# Patient Record
Sex: FEMALE | Race: WHITE | NOT HISPANIC OR LATINO | Employment: UNEMPLOYED | URBAN - METROPOLITAN AREA
[De-identification: names, ages, dates, MRNs, and addresses within clinical notes are randomized per-mention and may not be internally consistent; named-entity substitution may affect disease eponyms.]

---

## 2024-01-11 ENCOUNTER — TELEPHONE (OUTPATIENT)
Dept: OBSTETRICS AND GYNECOLOGY | Facility: CLINIC | Age: 44
End: 2024-01-11
Payer: COMMERCIAL

## 2024-01-11 RX ORDER — TIRZEPATIDE 10 MG/.5ML
10 INJECTION, SOLUTION SUBCUTANEOUS WEEKLY
COMMUNITY
Start: 2024-01-10

## 2024-01-11 RX ORDER — SULFAMETHOXAZOLE AND TRIMETHOPRIM 800; 160 MG/1; MG/1
TABLET ORAL
COMMUNITY
Start: 2023-10-19 | End: 2024-01-17

## 2024-01-11 NOTE — TELEPHONE ENCOUNTER
----- Message from Violeta Burnette sent at 1/11/2024  1:22 PM CST -----  Contact: self  Pt needs to est care pls call [phone] with appt options

## 2024-01-17 ENCOUNTER — OFFICE VISIT (OUTPATIENT)
Dept: OBSTETRICS AND GYNECOLOGY | Facility: CLINIC | Age: 44
End: 2024-01-17
Payer: COMMERCIAL

## 2024-01-17 VITALS — DIASTOLIC BLOOD PRESSURE: 80 MMHG | SYSTOLIC BLOOD PRESSURE: 112 MMHG | WEIGHT: 210.81 LBS

## 2024-01-17 DIAGNOSIS — Z97.5 IUD (INTRAUTERINE DEVICE) IN PLACE: ICD-10-CM

## 2024-01-17 DIAGNOSIS — Z30.9 ENCOUNTER FOR CONTRACEPTIVE MANAGEMENT, UNSPECIFIED TYPE: ICD-10-CM

## 2024-01-17 DIAGNOSIS — Z12.31 BREAST CANCER SCREENING BY MAMMOGRAM: ICD-10-CM

## 2024-01-17 DIAGNOSIS — Z01.419 ENCOUNTER FOR WELL WOMAN EXAM WITH ROUTINE GYNECOLOGICAL EXAM: Primary | ICD-10-CM

## 2024-01-17 PROCEDURE — 3074F SYST BP LT 130 MM HG: CPT | Mod: CPTII,S$GLB,, | Performed by: OBSTETRICS & GYNECOLOGY

## 2024-01-17 PROCEDURE — 1159F MED LIST DOCD IN RCRD: CPT | Mod: CPTII,S$GLB,, | Performed by: OBSTETRICS & GYNECOLOGY

## 2024-01-17 PROCEDURE — 99386 PREV VISIT NEW AGE 40-64: CPT | Mod: S$GLB,,, | Performed by: OBSTETRICS & GYNECOLOGY

## 2024-01-17 PROCEDURE — 3079F DIAST BP 80-89 MM HG: CPT | Mod: CPTII,S$GLB,, | Performed by: OBSTETRICS & GYNECOLOGY

## 2024-01-17 RX ORDER — ONDANSETRON 4 MG/1
TABLET, ORALLY DISINTEGRATING ORAL
COMMUNITY
Start: 2023-11-17

## 2024-01-17 RX ORDER — LEVOCETIRIZINE DIHYDROCHLORIDE 5 MG/1
5 TABLET, FILM COATED ORAL NIGHTLY
COMMUNITY

## 2024-01-17 RX ORDER — THYROID, PORCINE 90 MG/1
TABLET ORAL
COMMUNITY
Start: 2023-11-30

## 2024-01-17 NOTE — PROGRESS NOTES
CC: WELL WOMAN (age 40-44)    Patient Care Team:  celso pennington as Current PCP    HPI:  Patient is a 43 y.o. who presents for her well woman exam today.  History reviewed with patient.   Patient is without complaints or concerns today. It was about 9 yrs ago that her paragard was placed and wants to swap for a new one.      NEW PATIENT       CONTRACEPTION: IUD-paragard  GARDASIL: no- declines  HX ABNL PAPS: yrs ago-no treatment needed    REVIEW OF PRIOR DATA/ HEALTH MAINTENANCE:  LAST ANNUAL:    2016    LAST MMG-  2016-neg-in [place]  LAST LABS- does with PCP   LAST COLONOSCOPY-       Past Medical History:   Diagnosis Date    Hypothyroidism due to Hashimoto's thyroiditis     IUD (intrauterine device) in place     Parragard-placed    Pre-diabetes      SURGICAL HX:   has a past surgical history that includes  section; Laparoscopic gastric banding (); and Abdominoplasty.    SOCIAL HX:    reports that she has never smoked. She has never used smokeless tobacco. She reports that she does not drink alcohol and does not use drugs.    FAMILY HX:   family history includes Breast cancer in her mother; Colon cancer in her maternal grandmother; Diabetes in her father; Heart attack in her mother; Lung cancer in her paternal grandmother. .    ALLERGIES:  Pcn [penicillins]    Current Outpatient Medications   Medication Instructions    ARMOUR THYROID 90 mg Tab     levocetirizine (XYZAL) 5 mg, Oral, Nightly    MOUNJARO 10 mg, Subcutaneous, Weekly    ondansetron (ZOFRAN-ODT) 4 MG TbDL      ROS:  CONST:  No fever, chills, fatigue or unexpected changes in weight   CV: No chest pain or palpitations   RESP:  No shortness of breath or cough   GI: No abd pain, vomiting, diarrhea, blood in stool, or changes in bowel mvmts   SKIN: No rashes or lesions  MUSCULOSKELETAL: No joint swelling or pain   PSYCH: No changes in mood or insomia   BREASTS: No asymmetry, lumps, pain, nipple discharge, or skin changes   :  No dysuria,  urgency, frequency, hematuria or incontinence           No vag dc, itching, odor or dryness           No pelvic pain, dyspareunia, or abnormal vaginal bleeding     VITALS:  Blood pressure 112/80, weight 95.6 kg (210 lb 12.8 oz), last menstrual period 01/17/2024.  There is no height or weight on file to calculate BMI.     PHYSICAL EXAM-  APPEARANCE: Well appearing, in no acute distress.   NECK: Neck symmetric.   CV:  Normal rate   PULM: Normal resp rate, no resp distress, normal resp effort    PSYCH: Normal mood and affect, cooperative   SKIN: No rashes, lesions, or abnormal bruising   LYMPH: No inguinal or axillary adenopathy   ABD: Soft, without tenderness or masses.   BREAST: Symmetrical, no nipple changes, no skin changes, No palpable masses   PELVIC:  VULVA: No lesions. Normal female genitalia.  URETHRAL MEATUS: No masses, no significant prolapse.   BLADDER/ URETHRA: No masses or suprapubic tenderness   VAGINA: No lesions or erythema, No discharge.   CVX: No lesions,no cervical motion tenderness. -IUD string seen  UTERUS: Normal size/shape, mobile, non-tender   ADNEXA: No masses, tenderness, or fullness.   ANUS/ PERINEUM: Normal tone.  No lesions.     *female chaperone present for entire exam    ASSESSMENT and PLAN:  Encounter for well woman exam with routine gynecological exam  -     Liquid-based pap smear, screening    Breast cancer screening by mammogram  -     Mammo Digital Screening Bilat w/ [facility]; Future; Expected date: 01/31/2024    IUD (intrauterine device) in place    Encounter for contraceptive management, unspecified type  -     Device Authorization Order       FOLLOWUP:  1 year for wwe or sooner prn    COUNSELING:  Patient was counseled today on recommendation for yearly pelvic exam, current Pap guidelines, self breast exams, contraception, recommendations for annual screening mammograms, and routine screening colonoscopy at age 45.  Encouraged patient to see her PCP for other health maintenance.

## 2024-01-22 ENCOUNTER — TELEPHONE (OUTPATIENT)
Dept: OBSTETRICS AND GYNECOLOGY | Facility: CLINIC | Age: 44
End: 2024-01-22
Payer: COMMERCIAL

## 2024-01-22 LAB — Lab: NORMAL

## 2024-01-22 NOTE — PROGRESS NOTES
Please let patient know her pap and hpv are both negative    Please encourage her to set up the [employer] natali if she has not done so

## 2024-01-22 NOTE — TELEPHONE ENCOUNTER
----- Message from Maricarmen Mon MD sent at 1/22/2024 10:47 AM CST -----  Please let patient know her pap and hpv are both negative    Please encourage her to set up the [employer] natali if she has not done so

## 2024-03-11 DIAGNOSIS — Z30.430 ENCOUNTER FOR INSERTION OF INTRAUTERINE CONTRACEPTIVE DEVICE (IUD): Primary | ICD-10-CM

## 2024-07-08 ENCOUNTER — PROCEDURE VISIT (OUTPATIENT)
Dept: OBSTETRICS AND GYNECOLOGY | Facility: CLINIC | Age: 44
End: 2024-07-08
Payer: COMMERCIAL

## 2024-07-08 ENCOUNTER — OFFICE VISIT (OUTPATIENT)
Dept: OBSTETRICS AND GYNECOLOGY | Facility: CLINIC | Age: 44
End: 2024-07-08
Payer: COMMERCIAL

## 2024-07-08 VITALS
BODY MASS INDEX: 26.84 KG/M2 | DIASTOLIC BLOOD PRESSURE: 79 MMHG | HEIGHT: 67 IN | HEART RATE: 118 BPM | WEIGHT: 171 LBS | SYSTOLIC BLOOD PRESSURE: 113 MMHG

## 2024-07-08 DIAGNOSIS — N89.8 VAGINAL DISCHARGE: ICD-10-CM

## 2024-07-08 DIAGNOSIS — Z11.3 ENCOUNTER FOR SCREENING FOR INFECTIONS WITH PREDOMINANTLY SEXUAL MODE OF TRANSMISSION: ICD-10-CM

## 2024-07-08 DIAGNOSIS — N92.6 IRREGULAR BLEEDING: Primary | ICD-10-CM

## 2024-07-08 DIAGNOSIS — N92.6 IRREGULAR BLEEDING: ICD-10-CM

## 2024-07-08 DIAGNOSIS — Z72.89 OTHER PROBLEMS RELATED TO LIFESTYLE: ICD-10-CM

## 2024-07-08 LAB
B-HCG UR QL: NEGATIVE
CTP QC/QA: YES

## 2024-07-08 PROCEDURE — 3074F SYST BP LT 130 MM HG: CPT | Mod: CPTII,S$GLB,,

## 2024-07-08 PROCEDURE — 3008F BODY MASS INDEX DOCD: CPT | Mod: CPTII,S$GLB,,

## 2024-07-08 PROCEDURE — 99459 PELVIC EXAMINATION: CPT | Mod: S$GLB,,,

## 2024-07-08 PROCEDURE — 3078F DIAST BP <80 MM HG: CPT | Mod: CPTII,S$GLB,,

## 2024-07-08 PROCEDURE — 99212 OFFICE O/P EST SF 10 MIN: CPT | Mod: S$GLB,,,

## 2024-07-08 PROCEDURE — 81025 URINE PREGNANCY TEST: CPT | Mod: S$GLB,,,

## 2024-07-08 PROCEDURE — 1160F RVW MEDS BY RX/DR IN RCRD: CPT | Mod: CPTII,S$GLB,,

## 2024-07-08 PROCEDURE — 1159F MED LIST DOCD IN RCRD: CPT | Mod: CPTII,S$GLB,,

## 2024-07-08 PROCEDURE — 76830 TRANSVAGINAL US NON-OB: CPT | Mod: S$GLB,,, | Performed by: OBSTETRICS & GYNECOLOGY

## 2024-07-08 NOTE — PROGRESS NOTES
"  Subjective:      Patient ID: Dean Shepherd is a 44 y.o. female who presents for evaluation today.    Chief Complaint:    Vaginal Bleeding (Crampy and tender. Bleeding/spotting since the end of May. Bowel Movement increases the bleeding)      History of Present Illness  HPI She reports vaginal bleeding since the end of may, noting spotting in between her periods. She also notes this is worsened with intercourse, bowel movements, and physical activity. She feels the bleeding has currently slowed, she is changing a light panty liner every 4 hours. She has copper T IUD, it is approximately 9 years old, she reports it will be due to be removed next January. She reports her cycles are 20-25 days. She is also on mounjaro, reports regular bowel movements every other day. Denies visible blood in her stool. Hemorrhoids present for several years. Denies vaginal itching, discharge. Reports occasional off-smelling odor. She reports slight blood tinged with urination, denies UTI sx, no frequency or burning.     GYN History  Patient's last menstrual period was 2024 (approximate).   Date of Last Pap: N/A    VITALS  /79   Pulse (!) 118   Ht 5' 7" (1.702 m)   Wt 77.6 kg (171 lb)   LMP 2024 (Approximate)   BMI 26.78 kg/m²   Weight: 77.6 kg (171 lb)   Height: 5' 7" (170.2 cm)     PAST MEDICAL HISTORY  Past Medical History:   Diagnosis Date    Hypothyroidism due to Hashimoto's thyroiditis     IUD (intrauterine device) in place     Parragard-placed    Pre-diabetes        PAST SURGICAL HISTORY  Past Surgical History:   Procedure Laterality Date    ABDOMINOPLASTY       SECTION      LAPAROSCOPIC GASTRIC BANDING         SOCIAL HISTORY  Social History     Tobacco Use   Smoking Status Never   Smokeless Tobacco Never   ,   Social History     Substance and Sexual Activity   Alcohol Use Never        MEDICATIONS  Outpatient Medications Marked as Taking for the 24 encounter (Office Visit) with Rae, " Thea MILLS NP   Medication Sig Dispense Refill    ARMOUR THYROID 90 mg Tab       levocetirizine (XYZAL) 5 MG tablet Take 5 mg by mouth every evening.      MOUNJARO 10 mg/0.5 mL PnIj Inject 10 mg into the skin once a week.      ondansetron (ZOFRAN-ODT) 4 MG TbDL            Review of Systems   Review of Systems   Constitutional:  Negative for activity change.   Eyes:  Negative for visual disturbance.   Respiratory:  Negative for shortness of breath.    Cardiovascular:  Negative for chest pain.   Gastrointestinal:  Negative for abdominal pain.   Genitourinary:  Positive for vaginal bleeding and vaginal odor.        No abnormal vaginal bleeding   Musculoskeletal:  Negative for back pain.   Integumentary:  Negative for rash and breast mass.   Neurological:  Negative for numbness.   Psychiatric/Behavioral:          No mood disturbance or changes    Breast: Negative for mass          Objective:     Physical Exam:   Constitutional: She appears well-developed.               Genitourinary:    Vagina and uterus normal.   Rectum:      External hemorrhoid (non-thrombosed) present.      Pelvic exam was performed with patient supine.   The external female genitalia was normal.     Labial bartholins normal.There is no tenderness or lesion on the right labia. There is no tenderness or lesion on the left labia. Cervix is normal. Right adnexum displays no tenderness and no fullness. Left adnexum displays no tenderness and no fullness.    No foreign body in the vagina.      No signs of injury in the vagina.   Uterus is not enlarged and not tender. IUD strings visualized.              Neurological: She is alert.            Assessment:        1. Irregular bleeding       Irregular bleeding  -     US OB/GYN Procedure ([facility]); Future  -     POCT urine pregnancy         Plan:     Patient instructed to contact the clinic should any questions or concerns arise prior to her next office visit. Patient is happy with the plan of care at this  time, verbalizes understanding and denies outstanding questions.      Pt sent for pelvic ultrasound  UA/UPT negative in office. IUD positioned correctly  Ultrasound reviewed with Dr. Echols, discussed need for EMB or hysteroscopy D&C due to thickened endo  Will share results with Dr. Mon's office for scheduling  Chaperone present for exam     No

## 2024-07-10 LAB
BACTERIAL VAGINOSIS: NEGATIVE
CANDIDA GLABRATA: NEGATIVE
CANDIDA SPECIES: NEGATIVE
CHLAMYDIA: NEGATIVE
GONORRHEA: NEGATIVE
MYCOPLASMA GENITALIUM RESULT: NEGATIVE
SOURCE: NORMAL
TRICHOMONAS VAGINALIS: NEGATIVE

## 2024-07-17 ENCOUNTER — PATIENT MESSAGE (OUTPATIENT)
Dept: OBSTETRICS AND GYNECOLOGY | Facility: CLINIC | Age: 44
End: 2024-07-17
Payer: COMMERCIAL

## 2024-07-18 NOTE — TELEPHONE ENCOUNTER
Patient is wanting D&C done asap. Do you want me to see if another provider would do this for her?

## 2025-01-16 ENCOUNTER — PATIENT MESSAGE (OUTPATIENT)
Dept: OBSTETRICS AND GYNECOLOGY | Facility: CLINIC | Age: 45
End: 2025-01-16
Payer: COMMERCIAL

## 2025-02-05 RX ORDER — THYROID, PORCINE 120 MG/1
TABLET ORAL
COMMUNITY
Start: 2025-01-15

## 2025-02-05 NOTE — PROGRESS NOTES
CC: WELL WOMAN (age 40-44)    Patient Care Team:  Riccardo Walsh NP as PCP - General (Family Medicine)        HPI:  Patient is a 45 y.o. who presents for her well woman exam today.  History reviewed with patient. Pt has a paragard that  Is at almost 11 years and counseled on swapping for a new one or other options and pt would like to keep this one for now.    Patient also has additional concerns she wants to discuss at this visit (see additional problem note below)    CONTRACEPTION: IUD-paragard  GARDASIL: no- declines  HX ABNL PAPS: in past-no excisional tx    REVIEW OF PRIOR DATA/ HEALTH MAINTENANCE:  LAST ANNUAL:   2024   LAST MMG- 2016- has sched for in 2 weeks         Past Medical History:   Diagnosis Date    Hypothyroidism due to Hashimoto's thyroiditis     IUD (intrauterine device) in place     Parragard-placed 2015    Pre-diabetes      SURGICAL HX:   has a past surgical history that includes  section; Laparoscopic gastric banding (); and Abdominoplasty.    SOCIAL HX:    reports that she has never smoked. She has never used smokeless tobacco. She reports that she does not drink alcohol and does not use drugs.    Current Outpatient Medications   Medication Instructions    ARMOUR THYROID 120 mg Tab     ferrous sulfate (FEOSOL) Tab tablet 1 tablet, With breakfast    levocetirizine (XYZAL) 5 mg, Nightly    MOUNJARO 10 mg, Weekly     VITALS:  Blood pressure 101/73, weight 66.9 kg (147 lb 6.4 oz), last menstrual period 2025.  Body mass index is 23.09 kg/m².     PHYSICAL EXAM-  APPEARANCE: Well appearing, in no acute distress.  CV/ PULM: no resp distress, normal resp effort  PSYCH: Normal mood and affect, cooperative  SKIN: No rashes, lesions, or abnormal bruising  ABD: Soft, no masses, tenderness, or distension  BREASTS: No skin changes,nipple dc. No palpable masses or tenderness  PELVIC:  VULVA: Normal female genitalia. No lesions  BLADDER: No suprapubic  tenderness  VAGINA/ CVX:  No lesions, no d/c  UTERUS: mobile, non-tender  ADNEXA: No masses, tenderness, or fullness.  ANUS/ PERINEUM: Normal tone.  No lesions.           *patient verbally consented for exam and female chaperone present for entire exam    ASSESSMENT and PLAN:  Encounter for well woman exam with routine gynecological exam  -     Liquid-based pap smear, screening    Breast cancer screening by mammogram  -     Mammo Digital Screening Bilat w/ [facility]; Future; Expected date: 02/19/2025     FOLLOWUP:  1 year for wwe or sooner prn    COUNSELING:  Patient was counseled today on recommendation for yearly pelvic exam, current Pap guidelines, self breast exams, contraception, recommendations for annual screening mammograms, and routine screening colonoscopy at age 46.  Encouraged patient to see her PCP for other health maintenance.    PROBLEM VISIT:  Problem HPI/ROS:    Pt had an episode of heavy bleeding when her cycle started last may and lasted until August. Recent ultrasound reviewed with pt. Hasn't been heavy or prolonged since then but has some intermittent light bleeding. Had labs with her pcp who is an np at urgent care who is managing her hashimotos.  Recent labs show fsh of 5 but her tsh is 38 and with low free t3 and free t4 (.47). Pt used to be on synthroid for years and was switched to cytomel at eventually had to dc the cytomel bc she was getting palpitations.  then when she saw this np, he switched her to [facility] thyroid and her thyroid has been difficult to regulate ever since. Originally saw endocrinology yrs ago but not in last few years. Discussed her thryoid is still not regulated well and recommend seeing an endocrinologist to try and get her on the correct dosing and pt would like to do that. Pulled labs from last year and pt was increased from 60 to 90 mg and then a week ago was increased again to 120mg of armour.             Problem PHYS EXAM:  (pertinent findings noted in PHYS EXAM  above)     Problem ASSESMENT and PLAN:    Hashimoto's thyroiditis        *Total time spent: 40 min. 50 % or more was spent on counseling about diagnosis, treatment options, and coordination of care.

## 2025-02-06 ENCOUNTER — OFFICE VISIT (OUTPATIENT)
Dept: OBSTETRICS AND GYNECOLOGY | Facility: CLINIC | Age: 45
End: 2025-02-06
Payer: COMMERCIAL

## 2025-02-06 VITALS
BODY MASS INDEX: 23.09 KG/M2 | SYSTOLIC BLOOD PRESSURE: 101 MMHG | DIASTOLIC BLOOD PRESSURE: 73 MMHG | WEIGHT: 147.38 LBS

## 2025-02-06 DIAGNOSIS — E06.3 HASHIMOTO'S THYROIDITIS: ICD-10-CM

## 2025-02-06 DIAGNOSIS — Z12.31 BREAST CANCER SCREENING BY MAMMOGRAM: ICD-10-CM

## 2025-02-06 DIAGNOSIS — Z01.419 ENCOUNTER FOR WELL WOMAN EXAM WITH ROUTINE GYNECOLOGICAL EXAM: Primary | ICD-10-CM

## 2025-02-06 PROCEDURE — 3008F BODY MASS INDEX DOCD: CPT | Mod: CPTII,,, | Performed by: OBSTETRICS & GYNECOLOGY

## 2025-02-06 PROCEDURE — 99396 PREV VISIT EST AGE 40-64: CPT | Mod: S$PBB,,, | Performed by: OBSTETRICS & GYNECOLOGY

## 2025-02-06 PROCEDURE — 3078F DIAST BP <80 MM HG: CPT | Mod: CPTII,,, | Performed by: OBSTETRICS & GYNECOLOGY

## 2025-02-06 PROCEDURE — 1159F MED LIST DOCD IN RCRD: CPT | Mod: CPTII,,, | Performed by: OBSTETRICS & GYNECOLOGY

## 2025-02-06 PROCEDURE — 3074F SYST BP LT 130 MM HG: CPT | Mod: CPTII,,, | Performed by: OBSTETRICS & GYNECOLOGY

## 2025-02-06 PROCEDURE — 99213 OFFICE O/P EST LOW 20 MIN: CPT | Mod: 25,S$PBB,, | Performed by: OBSTETRICS & GYNECOLOGY

## 2025-02-06 RX ORDER — LANOLIN ALCOHOL/MO/W.PET/CERES
1 CREAM (GRAM) TOPICAL
COMMUNITY

## 2025-02-10 LAB — Lab: NORMAL

## 2025-02-13 ENCOUNTER — TELEPHONE (OUTPATIENT)
Dept: OBSTETRICS AND GYNECOLOGY | Facility: CLINIC | Age: 45
End: 2025-02-13
Payer: COMMERCIAL

## 2025-02-13 NOTE — TELEPHONE ENCOUNTER
Returned call to [facility]. Informed them that we are having problem with our fax/printer, and as soon as we are able to print, we would send them the requested information.

## 2025-02-13 NOTE — TELEPHONE ENCOUNTER
----- Message from Norma sent at 2/13/2025 12:41 PM CST -----  Type:  Needs Medical Advice    Who Called: Allie murray/ [facility]   Symptoms (please be specific): -   How long has patient had these symptoms:  -  Pharmacy name and phone #:  -  Would the patient rather a call back or a response via [facility]?    Best Call Back Number: [phone]  Additional Information:  ROLAN: wants to know if a fax was received sent on 02/10/25 please advise

## 2025-03-19 ENCOUNTER — TELEPHONE (OUTPATIENT)
Dept: OBSTETRICS AND GYNECOLOGY | Facility: CLINIC | Age: 45
End: 2025-03-19
Payer: COMMERCIAL

## 2025-03-19 NOTE — TELEPHONE ENCOUNTER
Attempted to contact Dr. Hardwick's office. No answer, unable to leave voice mail. I faxed and electronically sent the requested most recent office note.

## 2025-03-19 NOTE — TELEPHONE ENCOUNTER
----- Message from Estela sent at 3/19/2025  9:15 AM CDT -----  Contact: [phone]  Type:  Needs Medical AdviceWho Called: dr tracie gonzalez office Symptoms (please be specific): requesting most recent visit notes([id number])  Would the patient rather a call back or a response via [facility]? Call back Best Call Back Number: [phone] [id number] Information:

## 2025-06-03 ENCOUNTER — RESULTS FOLLOW-UP (OUTPATIENT)
Dept: OBSTETRICS AND GYNECOLOGY | Facility: CLINIC | Age: 45
End: 2025-06-03

## 2025-06-03 ENCOUNTER — OFFICE VISIT (OUTPATIENT)
Dept: OBSTETRICS AND GYNECOLOGY | Facility: CLINIC | Age: 45
End: 2025-06-03
Payer: COMMERCIAL

## 2025-06-03 ENCOUNTER — PROCEDURE VISIT (OUTPATIENT)
Dept: OBSTETRICS AND GYNECOLOGY | Facility: CLINIC | Age: 45
End: 2025-06-03
Payer: COMMERCIAL

## 2025-06-03 VITALS
BODY MASS INDEX: 22.62 KG/M2 | WEIGHT: 144.38 LBS | DIASTOLIC BLOOD PRESSURE: 74 MMHG | SYSTOLIC BLOOD PRESSURE: 106 MMHG

## 2025-06-03 DIAGNOSIS — N92.6 MENSES, IRREGULAR: Primary | ICD-10-CM

## 2025-06-03 DIAGNOSIS — B37.2 YEAST DERMATITIS: ICD-10-CM

## 2025-06-03 DIAGNOSIS — N83.201 CYST OF RIGHT OVARY: ICD-10-CM

## 2025-06-03 DIAGNOSIS — N92.4 EXCESSIVE BLEEDING IN PREMENOPAUSAL PERIOD: ICD-10-CM

## 2025-06-03 DIAGNOSIS — N92.1 MENORRHAGIA WITH IRREGULAR CYCLE: Primary | ICD-10-CM

## 2025-06-03 DIAGNOSIS — E06.3 HYPOTHYROIDISM DUE TO HASHIMOTO THYROIDITIS: ICD-10-CM

## 2025-06-03 DIAGNOSIS — N92.3 INTERMENSTRUAL BLEEDING: ICD-10-CM

## 2025-06-03 DIAGNOSIS — N92.1 MENORRHAGIA WITH IRREGULAR CYCLE: ICD-10-CM

## 2025-06-03 PROCEDURE — 3074F SYST BP LT 130 MM HG: CPT | Mod: CPTII,,, | Performed by: OBSTETRICS & GYNECOLOGY

## 2025-06-03 PROCEDURE — 76830 TRANSVAGINAL US NON-OB: CPT | Mod: 26,S$PBB,, | Performed by: OBSTETRICS & GYNECOLOGY

## 2025-06-03 PROCEDURE — 3008F BODY MASS INDEX DOCD: CPT | Mod: CPTII,,, | Performed by: OBSTETRICS & GYNECOLOGY

## 2025-06-03 PROCEDURE — 3078F DIAST BP <80 MM HG: CPT | Mod: CPTII,,, | Performed by: OBSTETRICS & GYNECOLOGY

## 2025-06-03 PROCEDURE — 99214 OFFICE O/P EST MOD 30 MIN: CPT | Mod: S$PBB,,, | Performed by: OBSTETRICS & GYNECOLOGY

## 2025-06-03 PROCEDURE — 1159F MED LIST DOCD IN RCRD: CPT | Mod: CPTII,,, | Performed by: OBSTETRICS & GYNECOLOGY

## 2025-06-03 RX ORDER — FLUCONAZOLE 200 MG/1
200 TABLET ORAL DAILY
Qty: 3 TABLET | Refills: 1 | Status: SHIPPED | OUTPATIENT
Start: 2025-06-03 | End: 2025-06-06

## 2025-06-03 RX ORDER — MEDROXYPROGESTERONE ACETATE 10 MG/1
10 TABLET ORAL DAILY
Qty: 90 TABLET | Refills: 4 | Status: SHIPPED | OUTPATIENT
Start: 2025-06-03

## 2025-08-12 ENCOUNTER — PROCEDURE VISIT (OUTPATIENT)
Dept: OBSTETRICS AND GYNECOLOGY | Facility: CLINIC | Age: 45
End: 2025-08-12
Payer: COMMERCIAL

## 2025-08-12 DIAGNOSIS — N83.201 CYST OF RIGHT OVARY: ICD-10-CM

## 2025-08-12 PROCEDURE — 76830 TRANSVAGINAL US NON-OB: CPT | Mod: 26,S$PBB,, | Performed by: OBSTETRICS & GYNECOLOGY
